# Patient Record
Sex: MALE | Race: ASIAN | NOT HISPANIC OR LATINO | Employment: FULL TIME | URBAN - METROPOLITAN AREA
[De-identification: names, ages, dates, MRNs, and addresses within clinical notes are randomized per-mention and may not be internally consistent; named-entity substitution may affect disease eponyms.]

---

## 2023-02-21 ENCOUNTER — HOSPITAL ENCOUNTER (EMERGENCY)
Facility: CLINIC | Age: 53
Discharge: HOME | End: 2023-02-21
Attending: EMERGENCY MEDICINE | Admitting: EMERGENCY MEDICINE
Payer: COMMERCIAL

## 2023-02-21 ENCOUNTER — APPOINTMENT (OUTPATIENT)
Dept: RADIOLOGY | Facility: CLINIC | Age: 53
End: 2023-02-21
Payer: COMMERCIAL

## 2023-02-21 VITALS
BODY MASS INDEX: 31.5 KG/M2 | TEMPERATURE: 97.9 F | DIASTOLIC BLOOD PRESSURE: 99 MMHG | SYSTOLIC BLOOD PRESSURE: 158 MMHG | RESPIRATION RATE: 99 BRPM | HEIGHT: 71 IN | HEART RATE: 75 BPM | OXYGEN SATURATION: 99 % | WEIGHT: 225 LBS

## 2023-02-21 DIAGNOSIS — I10 HYPERTENSION, UNSPECIFIED TYPE: ICD-10-CM

## 2023-02-21 LAB
ANION GAP SERPL CALCULATED.3IONS-SCNC: 12 MMOL/L (ref 5–18)
ATRIAL RATE - MUSE: 85 BPM
BASOPHILS # BLD AUTO: 0 10E3/UL (ref 0–0.2)
BASOPHILS NFR BLD AUTO: 0 %
BUN SERPL-MCNC: 14 MG/DL (ref 8–22)
CALCIUM SERPL-MCNC: 8.7 MG/DL (ref 8.5–10.5)
CHLORIDE BLD-SCNC: 102 MMOL/L (ref 98–107)
CO2 SERPL-SCNC: 25 MMOL/L (ref 22–31)
CREAT SERPL-MCNC: 1.03 MG/DL (ref 0.7–1.3)
DIASTOLIC BLOOD PRESSURE - MUSE: NORMAL MMHG
EOSINOPHIL # BLD AUTO: 0.1 10E3/UL (ref 0–0.7)
EOSINOPHIL NFR BLD AUTO: 2 %
ERYTHROCYTE [DISTWIDTH] IN BLOOD BY AUTOMATED COUNT: 13 % (ref 10–15)
FLUAV RNA SPEC QL NAA+PROBE: NEGATIVE
FLUBV RNA RESP QL NAA+PROBE: NEGATIVE
GFR SERPL CREATININE-BSD FRML MDRD: 88 ML/MIN/1.73M2
GLUCOSE BLD-MCNC: 133 MG/DL (ref 70–125)
HCT VFR BLD AUTO: 43.5 % (ref 40–53)
HGB BLD-MCNC: 14.1 G/DL (ref 13.3–17.7)
IMM GRANULOCYTES # BLD: 0 10E3/UL
IMM GRANULOCYTES NFR BLD: 0 %
INTERPRETATION ECG - MUSE: NORMAL
LYMPHOCYTES # BLD AUTO: 2.1 10E3/UL (ref 0.8–5.3)
LYMPHOCYTES NFR BLD AUTO: 24 %
MCH RBC QN AUTO: 27.4 PG (ref 26.5–33)
MCHC RBC AUTO-ENTMCNC: 32.4 G/DL (ref 31.5–36.5)
MCV RBC AUTO: 85 FL (ref 78–100)
MONOCYTES # BLD AUTO: 0.4 10E3/UL (ref 0–1.3)
MONOCYTES NFR BLD AUTO: 5 %
NEUTROPHILS # BLD AUTO: 5.9 10E3/UL (ref 1.6–8.3)
NEUTROPHILS NFR BLD AUTO: 69 %
NRBC # BLD AUTO: 0 10E3/UL
NRBC BLD AUTO-RTO: 0 /100
P AXIS - MUSE: 40 DEGREES
PLATELET # BLD AUTO: 301 10E3/UL (ref 150–450)
POTASSIUM BLD-SCNC: 3.6 MMOL/L (ref 3.5–5)
PR INTERVAL - MUSE: 128 MS
QRS DURATION - MUSE: 90 MS
QT - MUSE: 374 MS
QTC - MUSE: 445 MS
R AXIS - MUSE: 53 DEGREES
RBC # BLD AUTO: 5.15 10E6/UL (ref 4.4–5.9)
RSV RNA SPEC NAA+PROBE: NEGATIVE
SARS-COV-2 RNA RESP QL NAA+PROBE: NEGATIVE
SODIUM SERPL-SCNC: 139 MMOL/L (ref 136–145)
SYSTOLIC BLOOD PRESSURE - MUSE: NORMAL MMHG
T AXIS - MUSE: 17 DEGREES
TROPONIN I SERPL-MCNC: <0.01 NG/ML (ref 0–0.29)
VENTRICULAR RATE- MUSE: 85 BPM
WBC # BLD AUTO: 8.7 10E3/UL (ref 4–11)

## 2023-02-21 PROCEDURE — 99285 EMERGENCY DEPT VISIT HI MDM: CPT | Mod: CS,25

## 2023-02-21 PROCEDURE — 93005 ELECTROCARDIOGRAM TRACING: CPT | Performed by: EMERGENCY MEDICINE

## 2023-02-21 PROCEDURE — 71046 X-RAY EXAM CHEST 2 VIEWS: CPT

## 2023-02-21 PROCEDURE — 87637 SARSCOV2&INF A&B&RSV AMP PRB: CPT

## 2023-02-21 PROCEDURE — 84484 ASSAY OF TROPONIN QUANT: CPT

## 2023-02-21 PROCEDURE — C9803 HOPD COVID-19 SPEC COLLECT: HCPCS

## 2023-02-21 PROCEDURE — 85025 COMPLETE CBC W/AUTO DIFF WBC: CPT

## 2023-02-21 PROCEDURE — 36415 COLL VENOUS BLD VENIPUNCTURE: CPT

## 2023-02-21 PROCEDURE — 80048 BASIC METABOLIC PNL TOTAL CA: CPT

## 2023-02-21 ASSESSMENT — ENCOUNTER SYMPTOMS
NAUSEA: 0
CHILLS: 0
CONSTIPATION: 0
FEVER: 1
NUMBNESS: 0
FATIGUE: 1
HEADACHES: 1
VOMITING: 0
ABDOMINAL PAIN: 0
DIZZINESS: 0
DIARRHEA: 0
COUGH: 0
SHORTNESS OF BREATH: 1
EYE PAIN: 0

## 2023-02-21 ASSESSMENT — ACTIVITIES OF DAILY LIVING (ADL): ADLS_ACUITY_SCORE: 35

## 2023-02-21 NOTE — ED NOTES
"Pt reports that he forgot to mention that he has been taking an inhaler :\"seroflo\" that increases his BP and heartrate.  "

## 2023-02-21 NOTE — Clinical Note
Ji Buitrago was seen and treated in our emergency department on 2/21/2023.  He may return to work on 02/22/2023.       If you have any questions or concerns, please don't hesitate to call.      Arely Westbrook, RAJAN

## 2023-02-21 NOTE — ED PROVIDER NOTES
Emergency Department Staff Physician Note     I had a face to face encounter with this patient seen by the Advanced Practice Provider (ABELARDO).  I have seen, examined, and discussed the patient with the ABELARDO and agree with their assessment and plan of management.    I, Ester Diez, am serving as a scribe to document services personally performed by Babs Greer MD, based on my observations and the provider's statements to me.     Relevant HPI:     Ji Buitrago is a 52 year old male who presents to the Emergency Department for evaluation of a headache    Three years ago, patient reports he was diagnosed with HTN. He was told by his primary care provider to manage his HTN with diet and exercise. Two years ago, patient left the country. For the past two years, he has been living in [facility] with his parents. He reports during that time, he was healthy. Four months ago, patient moved back to [place]. Since moving back he started exercising and dieting to help manage his HTN. Patient reports that during exercising he experiences 3/10 non radiating intermittent centralized chest pain and shortness of breath.. The chest pain and shortness of breath resolves when not exercising. Yesterday, patient took his blood pressure and was concerned it was 140's systolic. Yesterday, he also felt fatigued, generalized headache and had a fever of 99 degrees. Patient's headache resolved after taking Advil.     I, Babs Greer MD, attest that Ester Diez was acting in a scribe capacity, has observed my performance of the services and has documented them in accordance with my direction.    ED Course:  3:00 PM I received the patient report from the ABELARDO (Arely Westbrook PA-C). I agree with PA  assessment and plan of management, and I will see the patient myself. I met with the patient to introduce myself, gather additional history, perform my initial exam, and discuss the plan.   Exam:  BP (!)  "181/107   Pulse 84   Temp 97.9  F (36.6  C) (Oral)   Resp 18   Ht 1.803 m (5' 11\")   Wt 102.1 kg (225 lb)   SpO2 97%   BMI 31.38 kg/m            Impression / ED Plan:  Ji Buitrago is a 52 year old male presents to the ED for evaluation of fatigue.       Please refer to the Advanced Practice Provider's note for further details and ED course. Agree with history, plan and disposition.             Babs Greer MD  Staff Physician  [facility] Emergency Department       Babs Greer MD  02/21/23 5464    "

## 2023-02-21 NOTE — ED PROVIDER NOTES
EMERGENCY DEPARTMENT ENCOUNTER      NAME: Ji Buitrago  AGE: 52 year old male  YOB: 1970  MRN: [id number]  EVALUATION DATE & TIME: 2/21/2023  2:28 PM    PCP: No primary care provider on file.    ED PROVIDER: Arely Westbrook PA-C      Chief Complaint   Patient presents with     Headache     Chest Pain     Hypertension         FINAL IMPRESSION:  1. Hypertension, unspecified type          ED COURSE & MEDICAL DECISION MAKING:    Pertinent Labs & Imaging studies reviewed. (See chart for details)  52 year old male presents to the Emergency Department for evaluation of hypertension.  Patient was diagnosed 3 years ago for hypertension.  For the past 2 years patient has been living in [facility].  4 months ago patient moved back to [place].  Since then he has been exercising and dieting.  Patient started experience centralized chest pain and shortness of breath while exercising.  He does not have chest pain or shortness of breath when not exercising.  Yesterday had a fever of 99 and headache which resolved with Advil.  Patient presented to the ED due to elevated blood pressure.  At home systolic 140s.  Vital signs reviewed and patient is hypertensive.  Initial blood pressure in the ED was 181/107.  Upon recheck blood pressure improved to 158/99.  Afebrile.  On exam chest wall is nontender to palpation.  Exam is unremarkable.    Differential diagnosis includes but not limited to COVID, influenza, RSV, other viral illness ACS, pericarditis, myocarditis, tamponade.  EKG showed sinus rhythm.  Normal EKG.  Influenza, RSV, COVID unremarkable.  Troponin normal.  CBC basic normal.  Chest x-ray shows lungs are clear.  Heart and pulmonary vascularity are normal.  No signs of acute disease.  Low suspicion for ACS at this time.  No signs of infection at this time.    Patient was educated on his imaging, labs, EKG results.  Patient was educated on hypertension.  Patient requested to follow-up with his primary  care doctor to discuss his hypertension.  Patient will keep a low-sodium diet.  Patient return to the ED if new symptoms develop or symptoms worsen.  Patient agrees with plan.  All questions answered.    ED COURSE:   2:48 PM  I saw the patient. Staffed with Dr. Greer.    4:30 PM I discharged the patient.  Patient agrees with plan.  All questions answered.    Additional Documentation    History:    Supplemental history from: Documented in chart, if applicable    External Record(s) reviewed: Documented in chart, if applicable.    Work Up:    Chart documentation includes differential considered and any EKGs or imaging interpreted by provider.    In additional to work up documented, I considered the following work up: Documented in chart, if applicable.    External consultation:    Discussion of management with another provider: Documented in chart, if applicable    Complicating factors:    Care impacted by chronic illness: N/A    Care affected by social determinants of health: N/A    Disposition considerations: Discharge. No recommendations on prescription strength medication(s). N/A.      MEDICATIONS GIVEN IN THE EMERGENCY:  Medications - No data to display    NEW PRESCRIPTIONS STARTED AT TODAY'S ER VISIT  There are no discharge medications for this patient.         =================================================================    HPI    Patient information was obtained from: patient.    Use of : N/A       Ji Buitrago is a 52 year old male with a pertinent history of HTN who presents to this ED for evaluation of HTN.    Three years ago, patient reports he was diagnosed with HTN. He was told by his primary care provider to manage his HTN with diet and exercise. Two years ago, patient left the country. For the past two years, he has been living in [place] with his parents. He reports during that time, he was healthy. Four months ago, patient moved back to [place]. Since moving back he started  "exercising and dieting to help manage his HTN. Patient reports that during exercising he experiences 3/10 non radiating intermittent centralized chest pain and shortness of breath. The chest pain and shortness of breath resolves when not exercising. Yesterday, patient took his blood pressure and was concerned it was 140's systolic. Yesterday, he also felt fatigued, generalized headache and had a fever of 99 degrees. Patient's headache and fever resolved after taking Advil.     He denies congestion, eye pressure, vision changes, dizziness, lightheadedness, confusion, nausea, vomiting, abdominal pain, constipation, diarrhea, urinary symptoms, cough, congestion.       REVIEW OF SYSTEMS   Review of Systems   Constitutional: Positive for fatigue and fever. Negative for chills.   HENT: Negative for congestion.    Eyes: Negative for pain and visual disturbance.        No eye pressure.   Respiratory: Positive for shortness of breath. Negative for cough.    Cardiovascular: Positive for chest pain.   Gastrointestinal: Negative for abdominal pain, constipation, diarrhea, nausea and vomiting.   Genitourinary: Negative.    Neurological: Positive for headaches (resolved). Negative for dizziness and numbness.        No confusion.   All other systems reviewed and are negative.    PAST MEDICAL HISTORY:  No past medical history on file.    PAST SURGICAL HISTORY:  No past surgical history on file.        CURRENT MEDICATIONS:    No current outpatient medications on file.      ALLERGIES:  No Known Allergies    FAMILY HISTORY:  No family history on file.    SOCIAL HISTORY:        VITALS:  BP (!) 158/99   Pulse 75   Temp 97.9  F (36.6  C) (Oral)   Resp (!) 99   Ht 1.803 m (5' 11\")   Wt 102.1 kg (225 lb)   SpO2 99%   BMI 31.38 kg/m      PHYSICAL EXAM    Physical Exam  Vitals and nursing note reviewed.   Constitutional:       Appearance: Normal appearance.   HENT:      Head: Atraumatic.      Right Ear: External ear normal.      Left " Ear: External ear normal.      Nose: Nose normal.      Mouth/Throat:      Mouth: Mucous membranes are moist.   Eyes:      Conjunctiva/sclera: Conjunctivae normal.      Pupils: Pupils are equal, round, and reactive to light.   Cardiovascular:      Rate and Rhythm: Normal rate and regular rhythm.      Pulses: Normal pulses.      Heart sounds: Normal heart sounds. No murmur heard.    No friction rub. No gallop.   Pulmonary:      Effort: Pulmonary effort is normal. No tachypnea, accessory muscle usage or respiratory distress.      Breath sounds: Normal breath sounds. No stridor. No decreased breath sounds, wheezing or rales.   Chest:      Chest wall: No mass, deformity, tenderness, crepitus or edema. There is no dullness to percussion.   Abdominal:      General: Bowel sounds are normal.      Palpations: Abdomen is soft.      Tenderness: There is no abdominal tenderness. There is no guarding or rebound.   Musculoskeletal:      Cervical back: Normal range of motion.   Skin:     General: Skin is dry.      Capillary Refill: Capillary refill takes less than 2 seconds.   Neurological:      General: No focal deficit present.      Mental Status: He is alert. Mental status is at baseline.      Cranial Nerves: No cranial nerve deficit.   Psychiatric:         Mood and Affect: Mood normal.         Thought Content: Thought content normal.          LAB:  All pertinent labs reviewed and interpreted.  Labs Ordered and Resulted from Time of ED Arrival to Time of ED Departure   BASIC METABOLIC PANEL - Abnormal       Result Value    Sodium 139      Potassium 3.6      Chloride 102      Carbon Dioxide (CO2) 25      Anion Gap 12      Urea Nitrogen 14      Creatinine 1.03      Calcium 8.7      Glucose 133 (*)     GFR Estimate 88     TROPONIN I - Normal    Troponin I <0.01     INFLUENZA A/B & SARS-COV2 PCR MULTIPLEX - Normal    Influenza A PCR Negative      Influenza B PCR Negative      RSV PCR Negative      SARS CoV2 PCR Negative     CBC WITH  PLATELETS AND DIFFERENTIAL    WBC Count 8.7      RBC Count 5.15      Hemoglobin 14.1      Hematocrit 43.5      MCV 85      MCH 27.4      MCHC 32.4      RDW 13.0      Platelet Count 301      % Neutrophils 69      % Lymphocytes 24      % Monocytes 5      % Eosinophils 2      % Basophils 0      % Immature Granulocytes 0      NRBCs per 100 WBC 0      Absolute Neutrophils 5.9      Absolute Lymphocytes 2.1      Absolute Monocytes 0.4      Absolute Eosinophils 0.1      Absolute Basophils 0.0      Absolute Immature Granulocytes 0.0      Absolute NRBCs 0.0          RADIOLOGY:  Reviewed all pertinent imaging. Please see official radiology report.  Chest XR,  PA & LAT   Final Result   IMPRESSION: Lungs are clear. Heart and pulmonary vascularity are normal. No signs of acute disease.          EKG:    Performed at: 2/21/23 14:26    Impression: Sinus rhythm.  Normal EKG.    Rate: 85  Rhythm: Sinus  [place] Interval: 128  QRS Interval: 90  Comparison: No previous    Dr. Greer independently reviewed and interpreted the EKG(s) documented above.    Arely Westbrook PA-C  [facility] EMERGENCY ROOM  [address] [id number] [phone]     Arely Westbrook PA-C  02/22/23 0802

## 2023-02-21 NOTE — DISCHARGE INSTRUCTIONS
Rest. Drink Fluids. Limit your salt intake. Return to the ED if symptoms worsen or new symptoms develop. Follow up with your primary care provider to discuss your BP.

## 2023-02-21 NOTE — ED TRIAGE NOTES
Patient presents to ED with headache that he had earlier today that was relieved by Advil and some chest tightness that he had earlier today, he has been monitoring his BP x6 today and they have been elevated, patient is having an appt on Friday r/t increased pressure in his eyes, pt denies taking BP meds.  Kristie Solano RN.......2/21/2023 2:27 PM     Triage Assessment     Row Name 02/21/23 1425       Triage Assessment (Adult)    Airway WDL WDL       Respiratory WDL    Respiratory WDL WDL       Skin Circulation/Temperature WDL    Skin Circulation/Temperature WDL WDL       Cardiac WDL    Cardiac WDL X;chest pain       Peripheral/Neurovascular WDL    Peripheral Neurovascular WDL WDL       Cognitive/Neuro/Behavioral WDL    Cognitive/Neuro/Behavioral WDL WDL